# Patient Record
Sex: FEMALE | Race: WHITE | NOT HISPANIC OR LATINO | URBAN - METROPOLITAN AREA
[De-identification: names, ages, dates, MRNs, and addresses within clinical notes are randomized per-mention and may not be internally consistent; named-entity substitution may affect disease eponyms.]

---

## 2020-07-29 ENCOUNTER — OFFICE VISIT (OUTPATIENT)
Dept: ORTHOPEDIC SURGERY | Facility: CLINIC | Age: 45
End: 2020-07-29

## 2020-07-29 VITALS — TEMPERATURE: 97.5 F | HEIGHT: 66 IN | BODY MASS INDEX: 32.43 KG/M2 | WEIGHT: 201.8 LBS

## 2020-07-29 DIAGNOSIS — M54.2 CERVICAL SPINE PAIN: Primary | ICD-10-CM

## 2020-07-29 DIAGNOSIS — M54.2 NECK PAIN: ICD-10-CM

## 2020-07-29 PROCEDURE — 72040 X-RAY EXAM NECK SPINE 2-3 VW: CPT | Performed by: ORTHOPAEDIC SURGERY

## 2020-07-29 PROCEDURE — 99204 OFFICE O/P NEW MOD 45 MIN: CPT | Performed by: ORTHOPAEDIC SURGERY

## 2020-07-29 RX ORDER — TIZANIDINE 4 MG/1
4 TABLET ORAL 3 TIMES DAILY
COMMUNITY
Start: 2018-06-06

## 2020-07-29 RX ORDER — LISINOPRIL 5 MG/1
5 TABLET ORAL DAILY
COMMUNITY
Start: 2018-06-23

## 2020-07-29 RX ORDER — GABAPENTIN 400 MG/1
1 CAPSULE ORAL EVERY 8 HOURS
COMMUNITY
Start: 2018-09-14 | End: 2020-10-21

## 2020-07-29 RX ORDER — ALPRAZOLAM 0.5 MG/1
TABLET ORAL
COMMUNITY
Start: 2016-04-02

## 2020-07-29 NOTE — PROGRESS NOTES
New patient or new problem visit    Chief Complaint   Patient presents with   • Cervical Spine - Establish Care       HPI: She complains of neck pain and headache ongoing for many months.  Her pain is severe constant stabbing and aching notes numbness and then and since June.  It is some better now.  She saw psychic who suggested her vagus nerve was to blame.  Chiropractor told her her neck was crooked.    PFSH: See chart- reviewed    Review of Systems   Constitutional: Positive for activity change, diaphoresis and fatigue.   HENT: Positive for congestion, sore throat, trouble swallowing and voice change.    Respiratory: Positive for wheezing.    Cardiovascular: Positive for palpitations and leg swelling.   Gastrointestinal: Positive for constipation.   Endocrine: Positive for heat intolerance and polydipsia.   Musculoskeletal: Positive for arthralgias, back pain, joint swelling, myalgias, neck pain and neck stiffness.   Neurological: Positive for dizziness, weakness, numbness and headaches.       PE: Constitutional: Vital signs above-noted.  Awake, alert and oriented    Psychiatric: Affect and insight do not appear grossly disturbed.    Pulmonary: Breathing is unlabored, color is good.    Skin: Warm, dry and normal turgor    Cardiac: Radial pulses intact.  No edema.    Eyesight and hearing appear adequate for examination purposes    Musculoskeletal:  Posture is unremarkable to coronal and sagittal inspection.  Motion appears undisturbed.  The skin about the area is intact.  There is no palpable or visible deformity.  There is no local spasm. There is some tenderness to percussion and palpation of the spine.     Neurologic:  In the bilateral upper extremities there is no evidence of atrophy.  Motor function is undisturbed in shoulder abduction, elbow flexion, wrist extension, finger extension, triceps extension, or finger abduction.  Sensation appears symmetrically intact to light touch.  Reflexes are 2+ and  symmetrical in the biceps, brachioradialis, and triceps. Ashley test is negative.  Gait appears undisturbed.  Spurling test is negative.          MEDICAL DECISION MAKING    XRAY: Plain film x-rays of cervical spine obtained today appear fairly unremarkable.  No comparison views are available.  MRI reportedly demonstrated spondylosis 5667 especially at 6 7 on the left.  Also noted was a small thyroid cyst for which they recommended follow-up.    Other: n/a    Impression: Cervical spondylosis with radiculopathy    Plan: Let us try epidurals I am going to send her to pain clinic.  I told her to discuss the thyroid finding with her family physician.  I asked her to bring me a CD of the images to look at myself.

## 2020-08-04 NOTE — PROGRESS NOTES
The patient has a pain history of the following:  Chronic neck pain  Cervical foraminal narrowing  Headache  Fibromyalgia    Previous interventions that the patient has received include:   None     Pain medications include:  Gabapentin  Tizanidine    Previously: Cymbalta, tizanidine, methocarbamol, Lyrica    Other conservative modalities which the patient reports using include:  Physical Therapy: yes, but not for this  Chiropractor: yes  Massage Therapy: from   TENS: no  Past pain management: yes; Commonalth pain  Ice pack/frozen water bottle    Past Significant Surgical History:  None     HPI:     CHIEF COMPLAINT: Neck Pain      Jessica Orlando is a 44 y.o. female referred here by Randolph Cannon MD. Jessica Orlando presents to the office for evaluation and treatment of Neck Pain      Onset:  A couple of years, worsened over the past month   Inciting Event:  None  Location:  Neck  Pain: Pain described as stabbing, pressure, and tender. Located in the neck and does radiate into the hands and the 4th and 5th fingers on the left.  Severity:  Pain rated as a 7 /10.  Apportions pain as 99%  neck pain and 1% extremity pain.  Symptoms have been constant.  Exacerbation:  Worse at night.   Alleviation:  Ice.  Associated Symptoms:   She denies any new onset of bowel or bladder weakness, significant leg weakness or saddle anesthesia. Denies balance problems or lower extremity incoordination.  She admits to headaches that are associated with the pain and blurry vision and nausea.   Ambulates: Without assistive device      She is currently on gabapentin and tizanidine for fibro myalgia. She admits to taking percocet from her  and mother because of the pain.        PEG Assessment   What number best describes your pain on average in the past week?8  What number best describes how, during the past week, pain has interfered with your enjoyment of life?8  What number best describes how, during the past week, pain has  interfered with your general activity?  8        Current Outpatient Medications:   •  ALPRAZolam (XANAX) 0.5 MG tablet, ALPRAZOLAM 0.5 MG TABS, Disp: , Rfl:   •  gabapentin (NEURONTIN) 400 MG capsule, 1 capsule Every 8 (Eight) Hours., Disp: , Rfl:   •  tiZANidine (ZANAFLEX) 4 MG tablet, Take 4 mg by mouth 3 (Three) Times a Day., Disp: , Rfl:   •  lisinopril (PRINIVIL,ZESTRIL) 5 MG tablet, Take 5 mg by mouth Daily., Disp: , Rfl:   •  metoprolol tartrate (LOPRESSOR) 25 MG tablet, Take 25 mg by mouth Daily., Disp: , Rfl:     The following portions of the patient's history were reviewed and updated as appropriate: allergies, current medications, past family history, past medical history, past social history, past surgical history and problem list.      REVIEW OF PERTINENT MEDICAL DATA    7/30/2020 X-ray cervical spine per Dr. Cannon's note : XRAY: Plain film x-rays of cervical spine obtained today appear fairly unremarkable.  No comparison views are available.  MRI reportedly demonstrated spondylosis 5667 especially at 6 7 on the left.  Also noted was a small thyroid cyst for which they recommended follow-up.     Other: n/a     Impression: Cervical spondylosis with radiculopathy          12/16/2019 Creatinine  0.74, Platelets 288 (10*3)    Review of Systems   Constitutional: Positive for activity change (decreased) and fatigue. Negative for chills and fever.   HENT: Positive for voice change.         Pressure on throat   Eyes: Positive for redness and visual disturbance.   Respiratory: Negative for chest tightness and shortness of breath.    Cardiovascular: Negative for chest pain.   Gastrointestinal: Negative for abdominal pain, constipation and diarrhea.   Genitourinary: Negative for difficulty urinating.   Musculoskeletal: Positive for neck pain and neck stiffness.   Neurological: Positive for dizziness, weakness, light-headedness, numbness (fingers on bilateral hands ) and headaches.   Psychiatric/Behavioral:  "Positive for agitation and sleep disturbance. The patient is nervous/anxious.      I have reviewed and confirmed the accuracy of the ROS as documented by the MA/LPN/RN Yanet Rice MD      Vitals:    08/05/20 1443   BP: (!) 159/106   Pulse: 77   Resp: 16   Temp: 98.3 °F (36.8 °C)   SpO2: 98%   Weight: 90.7 kg (200 lb)   Height: 167.6 cm (66\")   PainSc:   7   PainLoc: Neck         Objective   Physical Exam   Constitutional: She is oriented to person, place, and time. She appears well-developed and well-nourished.   HENT:   Head: Normocephalic.   Hearing normal   Eyes: No scleral icterus.   Red eyes   Cardiovascular: Normal rate.   Negative distal extremity edema   Pulmonary/Chest: Effort normal. No respiratory distress.   Musculoskeletal:   Cervical: Tenderness to palpation along the spinous processes.  Negative tenderness to palpation along the cervical facet joints.  Pain with neck flexion.  Negative bilateral Spurling's test.    Shoulders: Normal range of motion without pain.    Neurological: She is alert and oriented to person, place, and time.   Sensation to light touch decreased in the left medial forearm and into the 4th and 5th digits.  Normal biceps and brachioradialis reflexes.  Negative Ashley's sign bilaterally.    Skin: Skin is warm and dry.   Psychiatric: She has a normal mood and affect. Her behavior is normal.   Vitals reviewed.      Assessment/Plan   Problems Addressed this Visit     None      Visit Diagnoses     Cervical disc disease    -  Primary    Relevant Orders    Case Request    Cervical spondylosis without myelopathy        Relevant Orders    Case Request    Chronic neck pain        Relevant Orders    Case Request    Foraminal stenosis of cervical region        Bilateral occipital neuralgia              - Baseline urine drug screen was obtained showing oxycodone which is not a prescribed medication for her.   - Pertinent labs reviewed.   - Pertinent imaging reviewed.   - Patient's blood " pressure is elevated today in clinic. Patient denies s/s associated with HTN, including: shortness of breath, headache, blurry vision, chest pain, lightheadedness. Educated patient that if they experience any of these symptoms, they should report to the ED. Encouraged patient to follow regarding HTN with PCP.   - No medications prescribed.   - Will schedule C7-T1 epidural steroid injection.  Risks discussed.   - May also have occipital neuralgia contributing to headaches.  - Could consider EMG to determine if she has an ulnar neuropathy versus cervical issues causing the numbness in her left upper extremity.    --- Follow-up for C7-T1 SCOTT            ESE REPORT    ESE report has been reviewed and scanned into the patient's chart.    As the clinician, I personally reviewed the ESE from 8/5/2020 while the patient was in the office today.    While examining this patient, I wore protective equipment including a mask, face shield and gloves.  I washed my hands before and after this patient encounter.  The patient wore a mask throughout the visit as well.     Yanet Rice MD  Pain Management

## 2020-08-05 ENCOUNTER — RESULTS ENCOUNTER (OUTPATIENT)
Dept: PAIN MEDICINE | Facility: CLINIC | Age: 45
End: 2020-08-05

## 2020-08-05 ENCOUNTER — OFFICE VISIT (OUTPATIENT)
Dept: PAIN MEDICINE | Facility: CLINIC | Age: 45
End: 2020-08-05

## 2020-08-05 VITALS
TEMPERATURE: 98.3 F | HEIGHT: 66 IN | HEART RATE: 77 BPM | WEIGHT: 200 LBS | RESPIRATION RATE: 16 BRPM | OXYGEN SATURATION: 98 % | SYSTOLIC BLOOD PRESSURE: 159 MMHG | BODY MASS INDEX: 32.14 KG/M2 | DIASTOLIC BLOOD PRESSURE: 106 MMHG

## 2020-08-05 DIAGNOSIS — M54.81 BILATERAL OCCIPITAL NEURALGIA: ICD-10-CM

## 2020-08-05 DIAGNOSIS — M50.90 CERVICAL DISC DISEASE: ICD-10-CM

## 2020-08-05 DIAGNOSIS — M47.812 CERVICAL SPONDYLOSIS WITHOUT MYELOPATHY: ICD-10-CM

## 2020-08-05 DIAGNOSIS — M48.02 FORAMINAL STENOSIS OF CERVICAL REGION: ICD-10-CM

## 2020-08-05 DIAGNOSIS — M50.90 CERVICAL DISC DISEASE: Primary | ICD-10-CM

## 2020-08-05 DIAGNOSIS — G89.29 CHRONIC NECK PAIN: ICD-10-CM

## 2020-08-05 DIAGNOSIS — M54.12 CERVICAL RADICULITIS: ICD-10-CM

## 2020-08-05 DIAGNOSIS — M54.2 CHRONIC NECK PAIN: ICD-10-CM

## 2020-08-05 LAB
POC AMPHETAMINES: NEGATIVE
POC BARBITURATES: NEGATIVE
POC BENZODIAZEPHINES: NEGATIVE
POC COCAINE: NEGATIVE
POC METHADONE: NEGATIVE
POC METHAMPHETAMINE SCREEN URINE: NEGATIVE
POC OPIATES: NEGATIVE
POC OXYCODONE: POSITIVE
POC PHENCYCLIDINE: NEGATIVE
POC PROPOXYPHENE: NEGATIVE
POC THC: NEGATIVE
POC TRICYCLIC ANTIDEPRESSANTS: NEGATIVE

## 2020-08-05 PROCEDURE — 80305 DRUG TEST PRSMV DIR OPT OBS: CPT | Performed by: ANESTHESIOLOGY

## 2020-08-05 PROCEDURE — 99204 OFFICE O/P NEW MOD 45 MIN: CPT | Performed by: ANESTHESIOLOGY

## 2020-08-13 ENCOUNTER — TELEPHONE (OUTPATIENT)
Dept: PAIN MEDICINE | Facility: CLINIC | Age: 45
End: 2020-08-13

## 2020-08-13 NOTE — TELEPHONE ENCOUNTER
Pt called today stg she received a letter of denial for the CERVICAL EPIDURAL C7-T1. Pt is requesting narcotic pain medication in the meantime for relief since the injection was denied. Explained to pt all new pain medication requests requires an ov. Pt also requesting an ONB since the cervical epidural was denied. Call transferred to [place] for f/u appt. Please advise. Thank you.

## 2020-08-13 NOTE — TELEPHONE ENCOUNTER
Spoke with Morena she said more than likely insurance will not approve the ONB but she can try to submit auth if you would like? I can contact pt tomorrow, if we cannot get the ONB approved and pt not candidate for pain meds, would you like for us to cancel her appt we scheduled on 8/26/20? Please advise.

## 2020-08-13 NOTE — TELEPHONE ENCOUNTER
She is not a candidate for pain medication.  I have appealed her insurance's decision for the epidural injection.  Joanne could provide us with guidance about the timeline for that process.  I am not sure if we can submit for another procedure in the meantime, but Joanne may be able to answer that question as well.

## 2020-08-18 ENCOUNTER — TELEPHONE (OUTPATIENT)
Dept: PAIN MEDICINE | Facility: CLINIC | Age: 45
End: 2020-08-18

## 2020-08-18 NOTE — TELEPHONE ENCOUNTER
Joanne Phoned pt insurance no auth required for OCN would you like for us to call patient to schedule on the same day as her appt on 8/26/20?

## 2020-08-26 ENCOUNTER — PROCEDURE VISIT (OUTPATIENT)
Dept: PAIN MEDICINE | Facility: CLINIC | Age: 45
End: 2020-08-26

## 2020-08-26 VITALS
HEART RATE: 58 BPM | OXYGEN SATURATION: 98 % | HEIGHT: 66 IN | DIASTOLIC BLOOD PRESSURE: 106 MMHG | BODY MASS INDEX: 32.47 KG/M2 | TEMPERATURE: 97.1 F | WEIGHT: 202 LBS | RESPIRATION RATE: 16 BRPM | SYSTOLIC BLOOD PRESSURE: 181 MMHG

## 2020-08-26 DIAGNOSIS — M54.81 BILATERAL OCCIPITAL NEURALGIA: Primary | ICD-10-CM

## 2020-08-26 DIAGNOSIS — M54.2 CHRONIC NECK PAIN: ICD-10-CM

## 2020-08-26 DIAGNOSIS — G89.29 CHRONIC NECK PAIN: ICD-10-CM

## 2020-08-26 PROCEDURE — 64405 NJX AA&/STRD GR OCPL NRV: CPT | Performed by: ANESTHESIOLOGY

## 2020-08-26 NOTE — PROGRESS NOTES
"CHIEF COMPLAINT: Neck Pain      Jessica Orlando is a 44 y.o. female.  She is here for the following procedure:  bilateral occipital nerve block.     Vitals:    08/26/20 1109   BP: (!) 181/106   Pulse: 58   Resp: 16   Temp: 97.1 °F (36.2 °C)   SpO2: 98%   Weight: 91.6 kg (202 lb)   Height: 167.6 cm (66\")   PainSc:   7   PainLoc: Neck       Bupivacaine Lot#: [id number] Exp: 08/2022  Dexamethasone Lot#: [id number] Exp: 10/2020      Occipital      Patient reassessed immediately prior to procedure    Patient location during procedure: Pain Clinic    Performed by  Anesthesiologist: Yanet Rice MD  Preanesthetic Checklist  Completed: patient identified, surgical consent, pre-op evaluation, timeout performed and risks and benefits discussed  Prep:  Sterile barriers:gloves and mask  Prep: ChloraPrep  Procedure:  Sedation:no  Approach:bilateral  Guidance:landmark technique  Needle Gauge:25 G  Aspiration:negative  Medications:  Comment:8mg dexamethasone  0.25% bupivacaine 8mL    Post Assessment  Injection Assessment: negative  Patient Tolerance:comfortable throughout block  Complications:no  Additional Notes  The base of the skull was then palpated.  The occipital artery on the left was palpated approximately two finger breadths lateral to the occipital notch.  A 27-gauge 1.25 inch needle was advanced percutaneously medial to the artery to contact periosteum at the base of the skull.  After negative aspiration, a test dose of 1mL of injectate was injected.  There were no signs or symptoms of vascular uptake.  Subsequently, a volume of 4mL consisting of 4mg Dexamethasone and 0.25% Bupivacaine was injected without resistance over approximately one minute after multiple negative aspirations.      The same procedure was then performed on the contralateral side in the exact same fashion.         Patient's blood pressure is elevated today in clinic. Patient denies s/s associated with HTN, including: shortness of breath, headache, " blurry vision, chest pain, lightheadedness. Educated patient that if they experience any of these symptoms, they should report to the ED. Encouraged patient to follow regarding HTN with PCP.       Visit Diagnoses:  1. Bilateral occipital neuralgia    2. Chronic neck pain        Yanet Rice MD  Pain Management

## 2020-10-21 ENCOUNTER — OFFICE VISIT (OUTPATIENT)
Dept: PAIN MEDICINE | Facility: CLINIC | Age: 45
End: 2020-10-21

## 2020-10-21 VITALS
DIASTOLIC BLOOD PRESSURE: 88 MMHG | TEMPERATURE: 97.1 F | WEIGHT: 199.4 LBS | OXYGEN SATURATION: 96 % | SYSTOLIC BLOOD PRESSURE: 143 MMHG | HEART RATE: 57 BPM | BODY MASS INDEX: 32.05 KG/M2 | HEIGHT: 66 IN | RESPIRATION RATE: 18 BRPM

## 2020-10-21 DIAGNOSIS — G89.29 CHRONIC NECK PAIN: ICD-10-CM

## 2020-10-21 DIAGNOSIS — M54.81 BILATERAL OCCIPITAL NEURALGIA: Primary | ICD-10-CM

## 2020-10-21 DIAGNOSIS — M54.2 CHRONIC NECK PAIN: ICD-10-CM

## 2020-10-21 PROCEDURE — 64405 NJX AA&/STRD GR OCPL NRV: CPT | Performed by: ANESTHESIOLOGY

## 2020-10-21 RX ORDER — ALBUTEROL SULFATE 90 UG/1
AEROSOL, METERED RESPIRATORY (INHALATION)
COMMUNITY
Start: 2020-10-14

## 2020-10-21 RX ORDER — GABAPENTIN 600 MG/1
TABLET ORAL
COMMUNITY
Start: 2020-10-14

## 2020-10-21 RX ORDER — LORATADINE 10 MG/1
10 TABLET ORAL DAILY
COMMUNITY
Start: 2020-10-14

## 2020-10-21 NOTE — PROGRESS NOTES
"CHIEF COMPLAINT: Neck Pain      Jessica Orlando is a 45 y.o. female.  She is here for the following procedure:  bilateral occipital nerve block.   She states her pain is still in the back of her neck on both sides and travels up into her head.  Currently it is aching and 4/10 in severity.  Sometimes it is sharp or stabbing.        Vitals:    10/21/20 1547   BP: 143/88   Pulse: 57   Resp: 18   Temp: 97.1 °F (36.2 °C)   SpO2: 96%   Weight: 90.4 kg (199 lb 6.4 oz)   Height: 167.6 cm (66\")   PainSc:   4   PainLoc: Neck       Bupivacaine Lot#: [id number] Exp: Dec 2022  Depo Medrol Lot#: [id number] Exp: 10/2021      Occipital      Patient reassessed immediately prior to procedure    Patient location during procedure: Pain Clinic    Reason for block: procedure for pain  Performed by  Anesthesiologist: Yanet Rice MD  Preanesthetic Checklist  Completed: patient identified, surgical consent, pre-op evaluation, timeout performed and risks and benefits discussed  Prep:  Sterile barriers:gloves and mask  Prep: ChloraPrep  Procedure:  Sedation:no  Approach:bilateral  Guidance:palpation technique  Needle Gauge:25 G  Aspiration:negative  Medications:  Depomedrol:40 mg  Comment:0.25% bupivacaine 8mL    Post Assessment  Injection Assessment: negative  Patient Tolerance:comfortable throughout block  Complications:no (Denied numbness around lips, ringing in ears, metal taste in mouth.  Did complain of a tingling sensation into left lower extremity and cold sensation above eyebrows bilaterally.)  Additional Notes  The base of the skull was then palpated.  The occipital artery on the bilateral was palpated approximately two finger breadths lateral to the occipital notch.  A 27-gauge 1.25 inch needle was advanced percutaneously medial to the artery to contact periosteum at the base of the skull.  After negative aspiration, a test dose of 1mL of injectate was injected.  There were no signs or symptoms of vascular uptake.  Subsequently, " a volume of 4mL consisting of 20mg Methylprednisolone and 0.25% Bupivacaine was injected without resistance over approximately one minute after multiple negative aspirations on each side.            Tingling and cold sensations, I attribute to hyperventilation.  To be extra cautious, I asked her to stay in the lobby for 15-20 minutes after her injection to be sure she did not have any complications, however she left after 5 minutes.     Visit Diagnoses:  1. Bilateral occipital neuralgia    2. Chronic neck pain        May call prior to her next appointment if pain returns for local only occipital nerve block    Yanet Rice MD  Pain Management